# Patient Record
Sex: FEMALE | Race: WHITE
[De-identification: names, ages, dates, MRNs, and addresses within clinical notes are randomized per-mention and may not be internally consistent; named-entity substitution may affect disease eponyms.]

---

## 2018-06-23 ENCOUNTER — HOSPITAL ENCOUNTER (EMERGENCY)
Dept: HOSPITAL 61 - ER | Age: 28
Discharge: HOME | End: 2018-06-23
Payer: COMMERCIAL

## 2018-06-23 DIAGNOSIS — R11.2: ICD-10-CM

## 2018-06-23 DIAGNOSIS — R51: Primary | ICD-10-CM

## 2018-06-23 DIAGNOSIS — J45.909: ICD-10-CM

## 2018-06-23 DIAGNOSIS — R05: ICD-10-CM

## 2018-06-23 LAB
BACTERIA #/AREA URNS HPF: (no result) /HPF
BILIRUBIN,URINE: NEGATIVE
CLARITY,URINE: CLEAR
COLOR,URINE: YELLOW
GLUCOSE,URINE: NEGATIVE MG/DL
NITRITE UR QL STRIP: NEGATIVE
PH,URINE: 6
PROTEIN,URINE: NEGATIVE MG/DL
RBC,URINE: 0 /HPF (ref 0–2)
SPECIFIC GRAVITY,URINE: 1.01
SQUAMOUS EPITHELIAL CELL,UR: (no result) /LPF
URINE HCG POC: (no result)
UROBILINOGEN,URINE: 0.2 MG/DL
WBC #/AREA URNS HPF: 0 /HPF (ref 0–4)

## 2018-06-23 PROCEDURE — 81025 URINE PREGNANCY TEST: CPT

## 2018-06-23 PROCEDURE — 94640 AIRWAY INHALATION TREATMENT: CPT

## 2018-06-23 PROCEDURE — 81001 URINALYSIS AUTO W/SCOPE: CPT

## 2018-06-23 PROCEDURE — 96374 THER/PROPH/DIAG INJ IV PUSH: CPT

## 2018-06-23 PROCEDURE — 96361 HYDRATE IV INFUSION ADD-ON: CPT

## 2018-06-23 PROCEDURE — 96375 TX/PRO/DX INJ NEW DRUG ADDON: CPT

## 2018-06-23 PROCEDURE — 99285 EMERGENCY DEPT VISIT HI MDM: CPT

## 2018-06-23 PROCEDURE — 72125 CT NECK SPINE W/O DYE: CPT

## 2018-06-23 PROCEDURE — 70450 CT HEAD/BRAIN W/O DYE: CPT

## 2018-06-23 RX ADMIN — METHYLPREDNISOLONE SODIUM SUCCINATE 1 MG: 125 INJECTION, POWDER, FOR SOLUTION INTRAMUSCULAR; INTRAVENOUS at 23:06

## 2018-06-23 RX ADMIN — IPRATROPIUM BROMIDE AND ALBUTEROL SULFATE 1 ML: .5; 3 SOLUTION RESPIRATORY (INHALATION) at 22:54

## 2018-06-23 RX ADMIN — MORPHINE SULFATE 1 MG: 4 INJECTION, SOLUTION INTRAMUSCULAR; INTRAVENOUS at 23:06

## 2018-06-23 RX ADMIN — BACITRACIN 1 MLS/HR: 5000 INJECTION, POWDER, FOR SOLUTION INTRAMUSCULAR at 22:28

## 2018-06-23 RX ADMIN — PROCHLORPERAZINE EDISYLATE 1 MG: 5 INJECTION INTRAMUSCULAR; INTRAVENOUS at 21:58

## 2019-12-25 ENCOUNTER — HOSPITAL ENCOUNTER (EMERGENCY)
Dept: HOSPITAL 61 - ER | Age: 29
Discharge: HOME | End: 2019-12-25
Payer: COMMERCIAL

## 2019-12-25 VITALS — DIASTOLIC BLOOD PRESSURE: 62 MMHG | SYSTOLIC BLOOD PRESSURE: 136 MMHG

## 2019-12-25 VITALS — BODY MASS INDEX: 39.99 KG/M2 | HEIGHT: 65 IN | WEIGHT: 240 LBS

## 2019-12-25 DIAGNOSIS — J45.909: ICD-10-CM

## 2019-12-25 DIAGNOSIS — R05: ICD-10-CM

## 2019-12-25 DIAGNOSIS — R09.89: ICD-10-CM

## 2019-12-25 DIAGNOSIS — R63.0: ICD-10-CM

## 2019-12-25 DIAGNOSIS — Z90.89: ICD-10-CM

## 2019-12-25 DIAGNOSIS — B34.9: Primary | ICD-10-CM

## 2019-12-25 DIAGNOSIS — Z98.890: ICD-10-CM

## 2019-12-25 PROCEDURE — 99283 EMERGENCY DEPT VISIT LOW MDM: CPT

## 2019-12-25 NOTE — PHYS DOC
Past Medical History


Past Medical History:  Asthma


 (YARON CUI)


Past Surgical History:  , Tonsillectomy


 (YARON CUI)


Alcohol Use:  None


Drug Use:  None


 (YARON CUI)


Attending Signature


I have participated in the care of this patient and I have reviewed and agree 

with all pertinent clinical information above including history, exam, and 

recommendations.





 (BLANCHE PARMAR MD)





Adult General


Chief Complaint


Chief Complaint:  COUGH





HPI


HPI





Patient is a  29 year old female who presents with headache, loss of appetite, 

nausea, vomiting, sore throat, runny nose, cough that started at 1 AM this 

morning. Her only history is asthma.


 (YARON CUI)





Review of Systems


Review of Systems





Constitutional: Reports fever or chills and body aches.


Eyes: Denies change in visual acuity, redness, or eye pain []


HENT: Reports nasal congestion, sore throat, and runny nose.


Respiratory: Reports cough. Denies shortness of breath.


Cardiovascular: No additional information not addressed in HPI []


GI: Reports nausea and vomiting. Denies abdominal pain, bloody stools or 

diarrhea []


: Denies dysuria or hematuria []


Musculoskeletal: Denies back pain or joint pain []


Integument: Denies rash or skin lesions []


Neurologic: Reports headache, denies focal weakness or sensory changes []


Endocrine: Denies polyuria or polydipsia []





Complete systems were reviewed and found to be within normal limits, except as 

documented in this note.


 (YARON CUI)





Allergies


Allergies





Allergies








Coded Allergies Type Severity Reaction Last Updated Verified


 


  diphenhydramine Allergy Intermediate  10/10/15 No





 (BLANCHE PARMAR MD)





Physical Exam


Physical Exam





Constitutional: Well developed, well nourished, no acute distress, non-toxic 

appearance. []


HENT: Normocephalic, atraumatic, bilateral external ears normal, bilateral 

tympanic membranes are pearly grey, oropharynx moist, no oral exudates, nose 

turbinates are inflamed.


Eyes: PERRLA, EOMI, conjunctiva normal, no discharge. [] 


Neck: Normal range of motion, no tenderness, supple, no stridor. [] 


Cardiovascular:Heart rate regular rhythm, no murmur []


Lungs & Thorax:  Bilateral breath sounds clear to auscultation []


Abdomen: Bowel sounds normal, soft, no tenderness, no masses, no pulsatile 

masses. [] 


Skin: Warm, dry, no erythema, no rash. [] 


Neurologic: Alert and oriented X 3, normal motor function, normal sensory 

function, no focal deficits noted. []


Psychologic: Affect normal, judgement normal, mood normal. []


 (YARON CUI)





Current Patient Data


Vital Signs





                                   Vital Signs








  Date Time  Temp Pulse Resp B/P (MAP) Pulse Ox O2 Delivery O2 Flow Rate FiO2


 


19 18:05 99.2 104 12 136/62 (86) 96 Room Air  





 99.2       





 (BLANCHE PARMAR MD)





EKG


EKG


[]


 (YARON CUI)





Radiology/Procedures


Radiology/Procedures


[]


 (YARON CUI)





Course & Med Decision Making


Course & Med Decision Making


Pertinent Labs and Imaging studies reviewed. (See chart for details)





The patient appears to have the Flu clinically. Discussed with patient the 

importance of drinking plenty of fluids. I also discussed the importance of 

rest. It was discussed with the patient that she is contagious and to stay away 

from others until it has been a week since the start of her symptoms. Discussed 

with the patient that she can take Zyrtec per label instructions for runny nose.

 Also discussed the proper control of fever by rotating Tylenol and Ibuprofen at

 home. Will give the patient Decadron in the ER for symptom control. Will also 

prescribe Zofran for nausea. Will also prescribe Tamiflu.


 (YARON CUI)





Dragon Disclaimer


Dragon Disclaimer


This electronic medical record was generated, in whole or in part, using a voice

 recognition dictation system.


 (YARON CUI)





Departure


Departure


Impression:  


   Primary Impression:  


   Viral syndrome


Disposition:   HOME, SELF-CARE


Condition:  STABLE


Referrals:  


LAURI LEMONS (PCP)


Patient Instructions:  Influenza A (H1N1)





Additional Instructions:  


Thank you for visiting [facility]. We appreciate you trusting us 

with your care. If any additional problems come up don't hesitate to return to 

visit us. Please follow up with your primary care provider so they can plan 

additional care if needed and know about the problem that you had. If symptoms 

worsen come back to the Emergency Department. Any concerning symptoms that start

 such as chest pain, shortness of air, weakness or numbness on one side of the 

body, running high fevers or any other concerning symptoms return to the ER.





Please fill your medications at any pharmacy and follow the prescription 

instructions.





Please drink plenty of fluids. If unable to keep fluids down please return to 

ER.





Please get Tylenol and Ibuprofen over the counter. Give each medication every 6 

hours as directed by the medication labels. In order to utilize the peak of the 

medications stagger the medications to where the child is getting one of the 

medications every 3 hours. For example if you give Ibuprofen at 3 PM, you then 

give Tylenol at 6 PM and Ibuprofen again at 9 PM, and then Tylenol at midnight. 





Please get Zyrtec over the counter and take per label instructions for runny 

nose.


Scripts


Oseltamivir Phosphate (TAMIFLU) 75 Mg Capsule


75 MG PO BID for FLU for 5 Days, #10 TAB 0 Refills


   Prov: YARON CUI         19 


Ondansetron (ONDANSETRON ODT) 4 Mg Tab.rapdis


1 TAB PO PRN Q6-8HRS PRN for NAUSEA, #16 TAB


   Prov: YARON CUI         19











YARON CUI          Dec 25, 2019 18:25


BLANCHE PARMAR MD              Dec 25, 2019 18:27